# Patient Record
Sex: FEMALE | Race: WHITE | NOT HISPANIC OR LATINO | Employment: FULL TIME | URBAN - METROPOLITAN AREA
[De-identification: names, ages, dates, MRNs, and addresses within clinical notes are randomized per-mention and may not be internally consistent; named-entity substitution may affect disease eponyms.]

---

## 2024-01-12 ENCOUNTER — HOSPITAL ENCOUNTER (EMERGENCY)
Facility: HOSPITAL | Age: 44
Discharge: HOME | End: 2024-01-12
Attending: EMERGENCY MEDICINE
Payer: COMMERCIAL

## 2024-01-12 ENCOUNTER — APPOINTMENT (OUTPATIENT)
Dept: RADIOLOGY | Facility: HOSPITAL | Age: 44
End: 2024-01-12
Payer: COMMERCIAL

## 2024-01-12 ENCOUNTER — APPOINTMENT (OUTPATIENT)
Dept: CARDIOLOGY | Facility: HOSPITAL | Age: 44
End: 2024-01-12
Payer: COMMERCIAL

## 2024-01-12 VITALS
BODY MASS INDEX: 36.8 KG/M2 | TEMPERATURE: 97 F | HEIGHT: 62 IN | WEIGHT: 200 LBS | SYSTOLIC BLOOD PRESSURE: 140 MMHG | RESPIRATION RATE: 18 BRPM | HEART RATE: 58 BPM | OXYGEN SATURATION: 100 % | DIASTOLIC BLOOD PRESSURE: 72 MMHG

## 2024-01-12 DIAGNOSIS — R07.9 CHEST PAIN, UNSPECIFIED TYPE: Primary | ICD-10-CM

## 2024-01-12 DIAGNOSIS — R10.9 ABDOMINAL PAIN, UNSPECIFIED ABDOMINAL LOCATION: ICD-10-CM

## 2024-01-12 LAB
ALBUMIN SERPL BCP-MCNC: 4.8 G/DL (ref 3.4–5)
ALP SERPL-CCNC: 59 U/L (ref 33–110)
ALT SERPL W P-5'-P-CCNC: 14 U/L (ref 7–45)
ANION GAP SERPL CALC-SCNC: 13 MMOL/L (ref 10–20)
AST SERPL W P-5'-P-CCNC: 17 U/L (ref 9–39)
B-HCG SERPL-ACNC: <2 MIU/ML
BASOPHILS # BLD AUTO: 0.03 X10*3/UL (ref 0–0.1)
BASOPHILS NFR BLD AUTO: 0.6 %
BILIRUB SERPL-MCNC: 0.5 MG/DL (ref 0–1.2)
BUN SERPL-MCNC: 17 MG/DL (ref 6–23)
CALCIUM SERPL-MCNC: 9.5 MG/DL (ref 8.6–10.3)
CARDIAC TROPONIN I PNL SERPL HS: <3 NG/L (ref 0–13)
CHLORIDE SERPL-SCNC: 103 MMOL/L (ref 98–107)
CO2 SERPL-SCNC: 26 MMOL/L (ref 21–32)
CREAT SERPL-MCNC: 0.86 MG/DL (ref 0.5–1.05)
EGFRCR SERPLBLD CKD-EPI 2021: 86 ML/MIN/1.73M*2
EOSINOPHIL # BLD AUTO: 0.07 X10*3/UL (ref 0–0.7)
EOSINOPHIL NFR BLD AUTO: 1.3 %
ERYTHROCYTE [DISTWIDTH] IN BLOOD BY AUTOMATED COUNT: 12.6 % (ref 11.5–14.5)
FLUAV RNA RESP QL NAA+PROBE: NOT DETECTED
FLUBV RNA RESP QL NAA+PROBE: NOT DETECTED
GLUCOSE SERPL-MCNC: 86 MG/DL (ref 74–99)
HCT VFR BLD AUTO: 44.5 % (ref 36–46)
HGB BLD-MCNC: 14.5 G/DL (ref 12–16)
HOLD SPECIMEN: NORMAL
HOLD SPECIMEN: NORMAL
IMM GRANULOCYTES # BLD AUTO: 0.02 X10*3/UL (ref 0–0.7)
IMM GRANULOCYTES NFR BLD AUTO: 0.4 % (ref 0–0.9)
INR PPP: 1 (ref 0.9–1.1)
LIPASE SERPL-CCNC: 20 U/L (ref 9–82)
LYMPHOCYTES # BLD AUTO: 1.23 X10*3/UL (ref 1.2–4.8)
LYMPHOCYTES NFR BLD AUTO: 23.6 %
MAGNESIUM SERPL-MCNC: 2.18 MG/DL (ref 1.6–2.4)
MCH RBC QN AUTO: 28.8 PG (ref 26–34)
MCHC RBC AUTO-ENTMCNC: 32.6 G/DL (ref 32–36)
MCV RBC AUTO: 89 FL (ref 80–100)
MONOCYTES # BLD AUTO: 0.39 X10*3/UL (ref 0.1–1)
MONOCYTES NFR BLD AUTO: 7.5 %
NEUTROPHILS # BLD AUTO: 3.48 X10*3/UL (ref 1.2–7.7)
NEUTROPHILS NFR BLD AUTO: 66.6 %
NRBC BLD-RTO: 0 /100 WBCS (ref 0–0)
PLATELET # BLD AUTO: 321 X10*3/UL (ref 150–450)
POTASSIUM SERPL-SCNC: 4 MMOL/L (ref 3.5–5.3)
PROT SERPL-MCNC: 7.5 G/DL (ref 6.4–8.2)
PROTHROMBIN TIME: 10.9 SECONDS (ref 9.8–12.8)
RBC # BLD AUTO: 5.03 X10*6/UL (ref 4–5.2)
SARS-COV-2 RNA RESP QL NAA+PROBE: NOT DETECTED
SODIUM SERPL-SCNC: 138 MMOL/L (ref 136–145)
WBC # BLD AUTO: 5.2 X10*3/UL (ref 4.4–11.3)

## 2024-01-12 PROCEDURE — 85610 PROTHROMBIN TIME: CPT | Performed by: STUDENT IN AN ORGANIZED HEALTH CARE EDUCATION/TRAINING PROGRAM

## 2024-01-12 PROCEDURE — 2500000001 HC RX 250 WO HCPCS SELF ADMINISTERED DRUGS (ALT 637 FOR MEDICARE OP): Performed by: STUDENT IN AN ORGANIZED HEALTH CARE EDUCATION/TRAINING PROGRAM

## 2024-01-12 PROCEDURE — 84484 ASSAY OF TROPONIN QUANT: CPT | Performed by: STUDENT IN AN ORGANIZED HEALTH CARE EDUCATION/TRAINING PROGRAM

## 2024-01-12 PROCEDURE — 71046 X-RAY EXAM CHEST 2 VIEWS: CPT | Mod: COMPUTED RADIOGRAPHY X-RAY | Performed by: RADIOLOGY

## 2024-01-12 PROCEDURE — 93005 ELECTROCARDIOGRAM TRACING: CPT

## 2024-01-12 PROCEDURE — 83735 ASSAY OF MAGNESIUM: CPT | Performed by: STUDENT IN AN ORGANIZED HEALTH CARE EDUCATION/TRAINING PROGRAM

## 2024-01-12 PROCEDURE — 71275 CT ANGIOGRAPHY CHEST: CPT

## 2024-01-12 PROCEDURE — 36415 COLL VENOUS BLD VENIPUNCTURE: CPT | Performed by: STUDENT IN AN ORGANIZED HEALTH CARE EDUCATION/TRAINING PROGRAM

## 2024-01-12 PROCEDURE — 99285 EMERGENCY DEPT VISIT HI MDM: CPT | Performed by: EMERGENCY MEDICINE

## 2024-01-12 PROCEDURE — 84702 CHORIONIC GONADOTROPIN TEST: CPT | Performed by: STUDENT IN AN ORGANIZED HEALTH CARE EDUCATION/TRAINING PROGRAM

## 2024-01-12 PROCEDURE — 87636 SARSCOV2 & INF A&B AMP PRB: CPT | Performed by: STUDENT IN AN ORGANIZED HEALTH CARE EDUCATION/TRAINING PROGRAM

## 2024-01-12 PROCEDURE — 76705 ECHO EXAM OF ABDOMEN: CPT | Mod: FOREIGN READ | Performed by: RADIOLOGY

## 2024-01-12 PROCEDURE — 83690 ASSAY OF LIPASE: CPT | Performed by: STUDENT IN AN ORGANIZED HEALTH CARE EDUCATION/TRAINING PROGRAM

## 2024-01-12 PROCEDURE — 85025 COMPLETE CBC W/AUTO DIFF WBC: CPT | Performed by: STUDENT IN AN ORGANIZED HEALTH CARE EDUCATION/TRAINING PROGRAM

## 2024-01-12 PROCEDURE — 2550000001 HC RX 255 CONTRASTS: Performed by: EMERGENCY MEDICINE

## 2024-01-12 PROCEDURE — 2500000004 HC RX 250 GENERAL PHARMACY W/ HCPCS (ALT 636 FOR OP/ED): Performed by: STUDENT IN AN ORGANIZED HEALTH CARE EDUCATION/TRAINING PROGRAM

## 2024-01-12 PROCEDURE — 76705 ECHO EXAM OF ABDOMEN: CPT | Mod: FR

## 2024-01-12 PROCEDURE — 80053 COMPREHEN METABOLIC PANEL: CPT | Performed by: STUDENT IN AN ORGANIZED HEALTH CARE EDUCATION/TRAINING PROGRAM

## 2024-01-12 PROCEDURE — 71046 X-RAY EXAM CHEST 2 VIEWS: CPT | Mod: FY

## 2024-01-12 RX ORDER — LIDOCAINE HYDROCHLORIDE 20 MG/ML
3 SOLUTION OROPHARYNGEAL ONCE
Status: COMPLETED | OUTPATIENT
Start: 2024-01-12 | End: 2024-01-12

## 2024-01-12 RX ORDER — FAMOTIDINE 20 MG/1
20 TABLET, FILM COATED ORAL 2 TIMES DAILY
Qty: 30 TABLET | Refills: 0 | Status: SHIPPED | OUTPATIENT
Start: 2024-01-12 | End: 2024-01-27

## 2024-01-12 RX ORDER — LIDOCAINE HYDROCHLORIDE 20 MG/ML
3 SOLUTION OROPHARYNGEAL ONCE
Status: DISCONTINUED | OUTPATIENT
Start: 2024-01-12 | End: 2024-01-12

## 2024-01-12 RX ADMIN — LIDOCAINE HYDROCHLORIDE 3 ML: 20 SOLUTION ORAL; TOPICAL at 17:00

## 2024-01-12 RX ADMIN — SODIUM CHLORIDE 1000 ML: 9 INJECTION, SOLUTION INTRAVENOUS at 15:02

## 2024-01-12 RX ADMIN — IOHEXOL 90 ML: 350 INJECTION, SOLUTION INTRAVENOUS at 15:04

## 2024-01-12 ASSESSMENT — LIFESTYLE VARIABLES
EVER FELT BAD OR GUILTY ABOUT YOUR DRINKING: NO
HAVE YOU EVER FELT YOU SHOULD CUT DOWN ON YOUR DRINKING: NO
REASON UNABLE TO ASSESS: NO
EVER HAD A DRINK FIRST THING IN THE MORNING TO STEADY YOUR NERVES TO GET RID OF A HANGOVER: NO
HAVE PEOPLE ANNOYED YOU BY CRITICIZING YOUR DRINKING: NO

## 2024-01-12 ASSESSMENT — COLUMBIA-SUICIDE SEVERITY RATING SCALE - C-SSRS
2. HAVE YOU ACTUALLY HAD ANY THOUGHTS OF KILLING YOURSELF?: NO
1. IN THE PAST MONTH, HAVE YOU WISHED YOU WERE DEAD OR WISHED YOU COULD GO TO SLEEP AND NOT WAKE UP?: NO
6. HAVE YOU EVER DONE ANYTHING, STARTED TO DO ANYTHING, OR PREPARED TO DO ANYTHING TO END YOUR LIFE?: NO

## 2024-01-12 ASSESSMENT — PAIN DESCRIPTION - LOCATION: LOCATION: CHEST

## 2024-01-12 ASSESSMENT — PAIN DESCRIPTION - DESCRIPTORS: DESCRIPTORS: DISCOMFORT;PRESSURE

## 2024-01-12 ASSESSMENT — PAIN DESCRIPTION - ORIENTATION: ORIENTATION: MID

## 2024-01-12 ASSESSMENT — PAIN - FUNCTIONAL ASSESSMENT: PAIN_FUNCTIONAL_ASSESSMENT: 0-10

## 2024-01-12 ASSESSMENT — PAIN SCALES - GENERAL
PAINLEVEL_OUTOF10: 8
PAINLEVEL_OUTOF10: 5 - MODERATE PAIN

## 2024-01-12 ASSESSMENT — PAIN DESCRIPTION - PAIN TYPE: TYPE: CHRONIC PAIN

## 2024-01-12 NOTE — ED PROVIDER NOTES
HPI   Chief Complaint   Patient presents with    Chest Pain     Pain started at 10am, pain is in the center of the chest going into her back and then down the left side       This is a female patient, 43 years old, otherwise healthy presenting to the ED for chest pain, substernal, radiating to the back and into the left leg.  Started around 10 AM this morning, there is some decreased appetite there is associated with that, her last p.o. was 6 AM, when she had some breakfast sausage and eggs.  She denies any fever/chills, there is some increasing of the pain upon inspiration.    At the time of exam, she reports that the symptoms have improved on arrival to the ED.                              Vianey Coma Scale Score: 15                  Patient History   Past Medical History:   Diagnosis Date    Dry eye syndrome of unspecified lacrimal gland     Dry eye syndrome    Personal history of diseases of the skin and subcutaneous tissue     History of rosacea    Sjogren syndrome, unspecified (CMS/HCC)     Sjogrens syndrome     Past Surgical History:   Procedure Laterality Date    FOOT SURGERY  11/19/2012    Foot Surgery    MOUTH SURGERY  11/19/2012    Oral Surgery Tooth Extraction     No family history on file.  Social History     Tobacco Use    Smoking status: Not on file    Smokeless tobacco: Not on file   Substance Use Topics    Alcohol use: Not on file    Drug use: Not on file       Physical Exam   ED Triage Vitals [01/12/24 1300]   Temp Heart Rate Resp BP   36.1 °C (97 °F) 67 20 159/78      SpO2 Temp src Heart Rate Source Patient Position   100 % -- Monitor Sitting      BP Location FiO2 (%)     Right arm --       Physical Exam  Constitutional:       Appearance: Normal appearance.   HENT:      Head: Normocephalic and atraumatic.      Mouth/Throat:      Mouth: Mucous membranes are moist.   Eyes:      Extraocular Movements: Extraocular movements intact.   Cardiovascular:      Rate and Rhythm: Normal rate and regular  rhythm.      Heart sounds: Normal heart sounds. No murmur heard.  Pulmonary:      Effort: Pulmonary effort is normal. No respiratory distress.      Breath sounds: Normal breath sounds. No wheezing.   Abdominal:      General: There is no distension.      Palpations: Abdomen is soft.      Tenderness: There is abdominal tenderness. There is no guarding.      Comments: Epigastric and right upper quadrant area tenderness with some voluntary guarding, Pacheco's positive, no rigidity or rebound tenderness noted.   Musculoskeletal:      Right lower leg: No edema.      Left lower leg: No edema.   Skin:     General: Skin is warm and dry.   Neurological:      General: No focal deficit present.      Mental Status: She is alert and oriented to person, place, and time.   Psychiatric:         Mood and Affect: Mood normal.         Behavior: Behavior normal.         ED Course & MDM   Diagnoses as of 01/12/24 1733   Chest pain, unspecified type   Abdominal pain, unspecified abdominal location       Medical Decision Making  Chest attack called due to concern for dissection, will obtain basic laboratory workup to include cardiac enzymes.    My interpretation of the CT angiogram chest abdomen pelvis did not demonstrate evidence of dissection, no large aneurysm noted.  Laboratory studies were grossly unremarkable, CBC, CMP, troponin and INR within normal limits, lipase and magnesium within normal limits in the send swabs for COVID and flu negative.  In addition, the patient's right upper quadrant ultrasound did not demonstrate evidence of cholecystitis or cholelithiasis    On reassessment, patient is still comfortable appearing, she reports her pain is significantly improved since onset.  I will order for viscous lidocaine, she is requesting that she be discharged, she will be discharged home with outpatient PCP follow-up, prescription for Pepcid, strict return precautions for any new or worsening symptoms, she is agreeable to  plan    Discussed with the attending  Yrn Nails DO PGY-4  Emergency Medicine        Procedure  Procedures     Yrn Nails DO  Resident  01/12/24 1700       Yrn Nails DO  Resident  01/12/24 4936

## 2024-01-12 NOTE — Clinical Note
Turner Patel was seen and treated in our emergency department on 1/12/2024.  She may return to work on 01/15/2024.       If you have any questions or concerns, please don't hesitate to call.      Koffi Roman, DO

## 2024-02-12 LAB
ATRIAL RATE: 75 BPM
P AXIS: 41 DEGREES
P OFFSET: 194 MS
P ONSET: 148 MS
PR INTERVAL: 136 MS
Q ONSET: 216 MS
QRS COUNT: 12 BEATS
QRS DURATION: 94 MS
QT INTERVAL: 414 MS
QTC CALCULATION(BAZETT): 462 MS
QTC FREDERICIA: 445 MS
R AXIS: 44 DEGREES
T AXIS: 32 DEGREES
T OFFSET: 423 MS
VENTRICULAR RATE: 75 BPM

## 2024-06-05 ENCOUNTER — OFFICE VISIT (OUTPATIENT)
Dept: DERMATOLOGY | Facility: CLINIC | Age: 44
End: 2024-06-05
Payer: COMMERCIAL

## 2024-06-05 DIAGNOSIS — L23.89 OTHER ALLERGIC CONTACT DERMATITIS: ICD-10-CM

## 2024-06-05 DIAGNOSIS — D23.9 DERMATOFIBROMA: ICD-10-CM

## 2024-06-05 DIAGNOSIS — Z12.83 SKIN CANCER SCREENING: Primary | ICD-10-CM

## 2024-06-05 DIAGNOSIS — D22.9 NEVUS: ICD-10-CM

## 2024-06-05 PROCEDURE — 99203 OFFICE O/P NEW LOW 30 MIN: CPT | Performed by: NURSE PRACTITIONER

## 2024-06-05 RX ORDER — TRIAMCINOLONE ACETONIDE 1 MG/G
CREAM TOPICAL 2 TIMES DAILY PRN
Qty: 80 G | Refills: 1 | Status: SHIPPED | OUTPATIENT
Start: 2024-06-05

## 2024-06-05 NOTE — PROGRESS NOTES
Subjective     Turner Patel is a 43 y.o. female who presents for the following: Skin Check.     Review of Systems:  No other skin or systemic complaints other than what is documented elsewhere in the note.    The following portions of the chart were reviewed this encounter and updated as appropriate:          Skin Cancer History  No skin cancer on file.      Specialty Problems    None       Objective   Well appearing patient in no apparent distress; mood and affect are within normal limits.    A focused skin examination was performed. All findings within normal limits unless otherwise noted below.    Assessment/Plan   1. Skin cancer screening    The patient presented for a routine skin examination today. There are no specific concerns regarding skin health and no new or changing moles, lesions, or rashes.     Assessment: Based on the comprehensive skin examination, there were no concerning or abnormal findings. The patient's skin appeared to be in good health, without any notable dermatologic conditions or lesions.    Plan: Given the absence of any significant skin findings, no specific interventions or treatments are warranted at this time. The patient was educated on the importance of regular skin self-examinations and advised to promptly report any changes or concerns. Routine follow-up for a skin examination was recommended.    -These lesions have benign, reassuring patterns on dermoscopy.  -There were no concerning features found on exam today.  -Recommend continued self-observation, and to contact the office if any changes in nevi are  noticed.    Discussed/information given on safe sun practices and use of sunscreen, sun protective clothing or sun avoidance. Recommend to use OTC medication of sunscreen SPF 30 or higher on a daily basis prior to sun exposure to reduce the risk of skin cancer.    Contact Office if: Any lesions change in size, shape or color; itch, bum or bleed.         2. Nevus  Multiple  benign appearing flesh colored to pigmented macules and papules     Plan: Counseling.  I counseled the patient regarding the following:  Instructions: Monthly self-skin checks to monitor for any changes in moles are recommended. Expectations: Benign Nevi are pigmented nests of cells within the skin.No treatment is necessary. Contact Office if: Any moles change in size, shape or color; itch, bum or bleed.    3. Dermatofibroma  Right Thigh - Anterior  Firm pink-brown papule that dimples with lateral pressure.    4. Other allergic contact dermatitis  Left Lower Leg - Anterior    triamcinolone (Kenalog) 0.1 % cream - Left Lower Leg - Anterior  Apply topically 2 times a day as needed for rash.

## 2024-06-05 NOTE — PROGRESS NOTES
Subjective     Turner Patel is a 43 y.o. female who presents for the following: Skin Check.   New patient in today for full body skin exam, patient states this is her first ever exam.     Review of Systems:  No other skin or systemic complaints other than what is documented elsewhere in the note.    The following portions of the chart were reviewed this encounter and updated as appropriate:       Skin Cancer History  No skin cancer on file.    Specialty Problems    None    Past Medical History:  Turner Patel  has a past medical history of Dry eye syndrome of unspecified lacrimal gland, Personal history of diseases of the skin and subcutaneous tissue, and Sjogren syndrome, unspecified (Multi).    Past Surgical History:  Turner Patel  has a past surgical history that includes Foot surgery (11/19/2012) and Mouth surgery (11/19/2012).    Family History:  Patient family history is not on file.    Social History:  Turner Patel  has no history on file for tobacco use, alcohol use, and drug use.    Allergies:  Penicillin    Current Medications / CAM's:    Current Outpatient Medications:     famotidine (Pepcid) 20 mg tablet, Take 1 tablet (20 mg) by mouth 2 times a day for 15 days., Disp: 30 tablet, Rfl: 0     Objective   Well appearing patient in no apparent distress; mood and affect are within normal limits.    A full examination was performed including scalp, head, eyes, ears, nose, lips, neck, chest, axillae, abdomen, back, buttocks, bilateral upper extremities, bilateral lower extremities, hands, feet, fingers, toes, fingernails, and toenails. All findings within normal limits unless otherwise noted below.    Assessment/Plan   1. Skin cancer screening    The patient presented for a routine skin examination today. There are no specific concerns regarding skin health and no new or changing moles, lesions, or rashes.     Assessment: Based on the comprehensive skin examination, there were no concerning or  abnormal findings. The patient's skin appeared to be in good health, without any notable dermatologic conditions or lesions.    Plan: Given the absence of any significant skin findings, no specific interventions or treatments are warranted at this time. The patient was educated on the importance of regular skin self-examinations and advised to promptly report any changes or concerns. Routine follow-up for a skin examination was recommended.    -These lesions have benign, reassuring patterns on dermoscopy.  -There were no concerning features found on exam today.  -Recommend continued self-observation, and to contact the office if any changes in nevi are  noticed.    Discussed/information given on safe sun practices and use of sunscreen, sun protective clothing or sun avoidance. Recommend to use OTC medication of sunscreen SPF 30 or higher on a daily basis prior to sun exposure to reduce the risk of skin cancer.    Contact Office if: Any lesions change in size, shape or color; itch, bum or bleed.         2. Nevus  Multiple benign appearing flesh colored to pigmented macules and papules     Plan: Counseling.  I counseled the patient regarding the following:  Instructions: Monthly self-skin checks to monitor for any changes in moles are recommended. Expectations: Benign Nevi are pigmented nests of cells within the skin.No treatment is necessary. Contact Office if: Any moles change in size, shape or color; itch, bum or bleed.

## 2024-06-14 ENCOUNTER — APPOINTMENT (OUTPATIENT)
Dept: RADIOLOGY | Facility: HOSPITAL | Age: 44
End: 2024-06-14
Payer: COMMERCIAL

## 2024-06-14 ENCOUNTER — HOSPITAL ENCOUNTER (OUTPATIENT)
Dept: RADIOLOGY | Facility: HOSPITAL | Age: 44
Discharge: HOME | End: 2024-06-14
Payer: COMMERCIAL

## 2024-06-14 VITALS — WEIGHT: 195 LBS | BODY MASS INDEX: 35.88 KG/M2 | HEIGHT: 62 IN

## 2024-06-14 DIAGNOSIS — Z12.31 ENCOUNTER FOR SCREENING MAMMOGRAM FOR MALIGNANT NEOPLASM OF BREAST: ICD-10-CM

## 2024-06-14 PROCEDURE — 77067 SCR MAMMO BI INCL CAD: CPT

## 2024-06-21 ENCOUNTER — HOSPITAL ENCOUNTER (OUTPATIENT)
Dept: RADIOLOGY | Facility: HOSPITAL | Age: 44
Discharge: HOME | End: 2024-06-21
Payer: COMMERCIAL

## 2024-06-21 DIAGNOSIS — M25.551 PAIN IN RIGHT HIP: ICD-10-CM

## 2024-06-21 PROCEDURE — 73502 X-RAY EXAM HIP UNI 2-3 VIEWS: CPT | Mod: RT

## 2025-02-11 ENCOUNTER — HOSPITAL ENCOUNTER (OUTPATIENT)
Dept: CARDIOLOGY | Facility: HOSPITAL | Age: 45
Discharge: HOME | End: 2025-02-11
Payer: COMMERCIAL

## 2025-02-11 DIAGNOSIS — I73.9 PERIPHERAL VASCULAR DISEASE, UNSPECIFIED (CMS-HCC): ICD-10-CM

## 2025-02-11 PROCEDURE — 93922 UPR/L XTREMITY ART 2 LEVELS: CPT

## 2025-04-15 ENCOUNTER — APPOINTMENT (OUTPATIENT)
Dept: CARDIOLOGY | Facility: CLINIC | Age: 45
End: 2025-04-15
Payer: COMMERCIAL

## 2025-04-16 ENCOUNTER — OFFICE VISIT (OUTPATIENT)
Dept: VASCULAR SURGERY | Facility: CLINIC | Age: 45
End: 2025-04-16
Payer: COMMERCIAL

## 2025-04-16 VITALS
OXYGEN SATURATION: 99 % | BODY MASS INDEX: 30 KG/M2 | HEART RATE: 81 BPM | DIASTOLIC BLOOD PRESSURE: 52 MMHG | SYSTOLIC BLOOD PRESSURE: 120 MMHG | HEIGHT: 62 IN | WEIGHT: 163 LBS

## 2025-04-16 DIAGNOSIS — M35.00 SJOGREN'S SYNDROME, WITH UNSPECIFIED ORGAN INVOLVEMENT (MULTI): ICD-10-CM

## 2025-04-16 DIAGNOSIS — I73.00 RAYNAUD'S PHENOMENON WITHOUT GANGRENE: Primary | ICD-10-CM

## 2025-04-16 PROCEDURE — 99214 OFFICE O/P EST MOD 30 MIN: CPT | Performed by: NURSE PRACTITIONER

## 2025-04-16 PROCEDURE — 3008F BODY MASS INDEX DOCD: CPT | Performed by: NURSE PRACTITIONER

## 2025-04-16 PROCEDURE — 99204 OFFICE O/P NEW MOD 45 MIN: CPT | Performed by: NURSE PRACTITIONER

## 2025-04-16 RX ORDER — MELOXICAM 15 MG/1
1 TABLET ORAL
COMMUNITY
Start: 2025-03-22

## 2025-04-16 RX ORDER — AMLODIPINE BESYLATE 5 MG/1
5 TABLET ORAL DAILY
COMMUNITY
End: 2025-04-16 | Stop reason: SDUPTHER

## 2025-04-16 RX ORDER — AMLODIPINE BESYLATE 10 MG/1
10 TABLET ORAL DAILY
Qty: 90 TABLET | Refills: 3 | Status: SHIPPED | OUTPATIENT
Start: 2025-04-16 | End: 2026-04-16

## 2025-04-16 RX ORDER — LEVONORGESTREL 52 MG/1
1 INTRAUTERINE DEVICE INTRAUTERINE ONCE
COMMUNITY

## 2025-04-16 NOTE — PROGRESS NOTES
"Subjective   Turner PARK is a 44 y.o. female.    Chief Complaint:  45yo patient referred by primary care for Raynaud's.     HPI  Diagnosed in 2010 by Dr. Hernandez (Rheumatology). Presented with dry eyes for evaluation. Diagnosed with Sjogren's. 2013 had blistering on toes while shoveling snow. Started efforts to minimize cold exposure, fairly well controlled. MANFRED positive 2024. Had 40lb intentional weight loss. Endorses constant bilateral finger and toe coldness. Fingers now turn pale with blisters. Medication adherent- started on Norvasc 5mg x 1 month w/o significant improvement. Evaluated by Dr. Maria (Vascular Surgeon at [facility]). Referred for PVR, EKG, and CT chest/abd/pelvis.     PMHx: Raynaud's, Sjogren's  PSHx: Left foot surgery  Social Hx: Never smoker. Denies EtoH/illicit drug use.   Family Hx: Dad- Afib, Mother- Fibromyalgia. Daughter- Raynaud's. Son- Raynaud's.     ROS    Objective   Visit Vitals  /52 (BP Location: Left arm, Patient Position: Sitting)   Pulse 81   Ht 1.575 m (5' 2\")   Wt 73.9 kg (163 lb)   SpO2 99%   BMI 29.81 kg/m²   OB Status Having periods   Smoking Status Never   BSA 1.8 m²     Physical Exam    Lab Review:   Lab Results   Component Value Date     01/12/2024    K 4.0 01/12/2024     01/12/2024    CO2 26 01/12/2024    BUN 17 01/12/2024    CREATININE 0.86 01/12/2024    GLUCOSE 86 01/12/2024    CALCIUM 9.5 01/12/2024     Lab Results   Component Value Date    WBC 5.2 01/12/2024    HGB 14.5 01/12/2024    HCT 44.5 01/12/2024    MCV 89 01/12/2024     01/12/2024     Lab Results   Component Value Date    CHOL 127 09/12/2022    TRIG 82 09/12/2022    HDL 54.0 09/12/2022     Current Medications[1]    Assessment/Plan   Raynaud's  Bilateral finger and to discoloration. Cold intolerance.   Referral to Dr. Sasha Ramos.          [1]    Current Outpatient Medications:   •  famotidine (Pepcid) 20 mg tablet, Take 1 tablet (20 mg) by mouth 2 times a day for 15 days., " Disp: 30 tablet, Rfl: 0  •  triamcinolone (Kenalog) 0.1 % cream, Apply topically 2 times a day as needed for rash., Disp: 80 g, Rfl: 1   diastolic filling. LVEF 55-60%.   LV with normal diastolic function.   Left Atrium: The left atrium is normal in size.   Right Ventricle: The right ventricle is normal in size. There is normal right ventricular global systolic function.   Right Atrium: The right atrium is normal in size.   Aortic Valve: The aortic valve was not well visualized. There is no evidence of aortic valve regurgitation. The peak instantaneous gradient of the aortic valve is 16.5 mmHg. The mean gradient of the aortic valve is 10.0 mmHg. AV with no stenosis or regurgitation.   Mitral Valve: The mitral valve is normal in structure. There is no evidence of mitral valve stenosis. There is no evidence of mitral valve regurgitation.   Tricuspid Valve: The tricuspid valve is structurally normal. There is mild to moderate tricuspid regurgitation. The Doppler estimated RVSP is within normal limits at 28.4 mmHg. TR is eccentric with jet directed toward interatrial septum.   Pulmonic Valve: The pulmonic valve is not well visualized. The pulmonic valve regurgitation was not well visualized.   Pericardium: There is no pericardial effusion noted.   Aorta: The aortic root is normal.       CONCLUSIONS:   1. The left ventricular systolic function is normal.   2. LVEF 55-60%.   LV with normal diastolic function.   3. RVSP within normal limits.   4. There is mild to moderate tricuspid regurgitation.   5. TR is eccentric with jet directed toward interatrial septum.   6. AV with no stenosis or regurgitation.     QUANTITATIVE DATA SUMMARY:   2D MEASUREMENTS:   Normal Ranges:   Ao Root d:     3.20 cm   (2.0-3.7cm)   LAs:           3.30 cm   (2.7-4.0cm)   RVIDd:         2.21 cm   (0.9-3.6cm)   IVSd:          1.08 cm   (0.6-1.1cm)   LVPWd:         0.85 cm   (0.6-1.1cm)   LVIDd:         4.82 cm   (3.9-5.9cm)   LVIDs:         3.38 cm   LV Mass Index: 87.1 g/m2   LV % FS        29.9 %     AORTA MEASUREMENTS:   Normal Ranges:   Asc Ao, d: 2.50 cm (2.1-3.4cm)     LV  SYSTOLIC FUNCTION BY 2D PLANIMETRY (MOD):   Normal Ranges:   EF-A4C View: 57.8 % (>55%)     LV DIASTOLIC FUNCTION:   Normal Ranges:   MV Peak E:    0.93 m/s (0.7-1.2 m/s)   MV Peak A:    0.63 m/s (0.42-0.7 m/s)   E/A Ratio:    1.49     (1.0-2.2)   MV lateral e' 0.16 m/s   MV medial e'  0.11 m/s   E/e' Ratio:   5.90     (<8.0)     AORTIC VALVE:   Normal Ranges:   AoV Vmax:                2.03 m/s  (<1.7m/s)   AoV Peak P.5 mmHg (<20mmHg)   AoV Mean PG:             10.0 mmHg (1.7-11.5mmHg)   LVOT Max Toni:            1.22 m/s  (<1.1m/s)   AoV VTI:                 45.60 cm  (18-25cm)   LVOT VTI:                28.40 cm   LVOT Diameter:           1.70 cm   (1.8-2.4cm)   AoV Area, VTI:           1.41 cm2  (2.5-5.5cm2)   AoV Area,Vmax:           1.36 cm2  (2.5-4.5cm2)   AoV Dimensionless Index: 0.62     TRICUSPID VALVE/RVSP:   Normal Ranges:   Peak TR Velocity: 2.52 m/s   RV Syst Pressure: 28.4 mmHg (< 30mmHg)     PULMONIC VALVE:   Normal Ranges:   PV Max Toni: 0.7 m/s  (0.6-0.9m/s)   PV Max P.0 mmHg       [id number] Drew Richter MD   Electronically signed on 10/14/2020 at 1:04:45 PM     [facility] US ANKLE BRACHIAL INDEX (JENIFFER) WITHOUT EXERCISE     Patient Name:      ISABELLA Gordillo Physician:  [id number] JAY Olivas MD  Study Date:        2025             Ordering Provider:  [id number] BROOK PADILLA  MRN/PID:           [id number]              Fellow:  Accession#:        [id number]          Technologist:       Sho Pacheco RVT, [facility]  Date of Birth/Age: 1980 / 44 years Technologist 2:  Gender:            F                     Encounter#:         [id number]  Admission Status:  Outpatient            Location Performed: [facility]         Diagnosis/ICD: Peripheral vascular disease, unspecified-I73.9  Indication:    Discolored digits  CPT Codes:     96794 Peripheral artery JENIFFER Only        Pertinent History: Digits are discolored w/ blood blister appearance.        CONCLUSIONS:  Right Lower PVR: No evidence of arterial occlusive disease in the right lower extremity at rest. Decreased digital perfusion noted. Multiphasic flow is noted in the right common femoral artery, right posterior tibial artery and right dorsalis pedis artery. The digit tracings at rest are flatlined.  Left Lower PVR: No evidence of arterial occlusive disease in the left lower extremity at rest. Decreased digital perfusion noted. Multiphasic flow is noted in the left common femoral artery, left posterior tibial artery and left dorsalis pedis artery. The digit tracings at rest are flatlined. Able to obtain a .30 TBI @ the start of test.     Imaging & Doppler Findings:     RIGHT Lower PVR                Pressures Ratios  Right Posterior Tibial (Ankle) 141 mmHg  1.24  Right Dorsalis Pedis (Ankle)   130 mmHg  1.14  Right Digit (Great Toe)        0 mmHg    0.00           LEFT Lower PVR                Pressures Ratios  Left Posterior Tibial (Ankle) 146 mmHg  1.28  Left Dorsalis Pedis (Ankle)   144 mmHg  1.26  Left Digit (Great Toe)        34 mmHg   0.30                              Right     Left  Brachial Pressure 114 mmHg 113 mmHg           [id number] JAY Olivas MD  Electronically signed by [id number] Olivas MD on 2/12/2025 at 1:58:23 PM     Current Medications[1]    Assessment/Plan   Secondary Raynaud's Phenomenon in setting of Sjogren's Syndrome  Endorses constant bilateral finger and toe coldness. Fingers now turn pale with blisters. Medication adherent- started on Norvasc 5mg x 1 month w/o significant improvement.   Bilateral toes are cyanotic and cool to touch on exam. See media images. Palpable bilateral DP/PT pulses.   Reviewed outside records MANFRED  negative (2022).  Rheumatoid Factor 19 (2022). ESR WNL (2022). CRP WNL (2022). TSH/CBC?CMP 1/2025 WNL.   Not a smoker. Counseled to continue to avoid precipitating exposures. No relief with Amlodipine 5mg every day. Increase Amlodipine 10mg every day.   Referral to Dr. Sasha Ramos for Vascular Medicine. Discussed with Dr. Sasha Ramos. Advised patient to schedule follow up with Rheumatology.        [1]   Current Outpatient Medications:     famotidine (Pepcid) 20 mg tablet, Take 1 tablet (20 mg) by mouth 2 times a day for 15 days., Disp: 30 tablet, Rfl: 0    triamcinolone (Kenalog) 0.1 % cream, Apply topically 2 times a day as needed for rash., Disp: 80 g, Rfl: 1

## 2025-04-28 ASSESSMENT — ENCOUNTER SYMPTOMS
PARESTHESIAS: 0
NUMBNESS: 0
CLAUDICATION: 0
POOR WOUND HEALING: 1
COLOR CHANGE: 1
WEIGHT LOSS: 1

## 2025-05-28 ENCOUNTER — APPOINTMENT (OUTPATIENT)
Dept: LAB | Facility: HOSPITAL | Age: 45
End: 2025-05-28
Payer: COMMERCIAL

## 2025-05-28 ENCOUNTER — APPOINTMENT (OUTPATIENT)
Facility: CLINIC | Age: 45
End: 2025-05-28
Payer: COMMERCIAL

## 2025-05-28 VITALS
TEMPERATURE: 98.4 F | DIASTOLIC BLOOD PRESSURE: 75 MMHG | WEIGHT: 159 LBS | HEIGHT: 63 IN | BODY MASS INDEX: 28.17 KG/M2 | HEART RATE: 67 BPM | SYSTOLIC BLOOD PRESSURE: 124 MMHG

## 2025-05-28 DIAGNOSIS — M35.00 SJOGREN'S SYNDROME, WITH UNSPECIFIED ORGAN INVOLVEMENT (MULTI): Primary | ICD-10-CM

## 2025-05-28 DIAGNOSIS — I73.00 RAYNAUD'S PHENOMENON WITHOUT GANGRENE: ICD-10-CM

## 2025-05-28 PROCEDURE — 84155 ASSAY OF PROTEIN SERUM: CPT

## 2025-05-28 PROCEDURE — 3008F BODY MASS INDEX DOCD: CPT | Performed by: INTERNAL MEDICINE

## 2025-05-28 PROCEDURE — 84165 PROTEIN E-PHORESIS SERUM: CPT

## 2025-05-28 PROCEDURE — 1036F TOBACCO NON-USER: CPT | Performed by: INTERNAL MEDICINE

## 2025-05-28 PROCEDURE — 99205 OFFICE O/P NEW HI 60 MIN: CPT | Performed by: INTERNAL MEDICINE

## 2025-05-28 RX ORDER — FLUOXETINE 20 MG/1
20 CAPSULE ORAL DAILY
Qty: 60 CAPSULE | Refills: 0 | Status: SHIPPED | OUTPATIENT
Start: 2025-05-28 | End: 2025-07-27

## 2025-05-28 RX ORDER — NITROGLYCERIN 20 MG/G
0.3 OINTMENT TOPICAL
Qty: 6 G | Refills: 0 | Status: SHIPPED | OUTPATIENT
Start: 2025-05-28 | End: 2025-07-27

## 2025-05-28 RX ORDER — SERTRALINE HYDROCHLORIDE 25 MG/1
25 TABLET, FILM COATED ORAL DAILY
Qty: 30 TABLET | Refills: 1 | Status: SHIPPED | OUTPATIENT
Start: 2025-05-28 | End: 2025-05-28 | Stop reason: ALTCHOICE

## 2025-05-28 NOTE — PATIENT INSTRUCTIONS
I am ordering some blood tests    We can increase the dose of amlodipine to 15 mg daily    Take tylenol extra 1000 mg upto three times a day for joint pains and take Meloxicam as needed not daily.    And I am adding a medicine called Prozac to help with the Raynauds episodes.

## 2025-05-28 NOTE — PROGRESS NOTES
Subjective   Patient ID: [id number]   Turner PARK is a 44 y.o. female who presents for raynauds (Hands are cold and toe issue).  HPI    New referral for Raynauds -   Diagnosed with secondary Raynauds about a decade ago with seronegative SjD  Initially was on Nifedipine which helped then she was doing better and stopped CCB and continued with conservative measures    Recently , she has been having recurrent RP episodes  Last winter was the worst  With now permanent cyanosis of the right few toes, leading upto blister formation  She has been seen by vascular medicine and surgery  Had PVR and duplex done showing no flow in the distral digital arteries  Resumed back on Amlodipine 5 mg by PCP prior to referral  And increased to 10 mg recently in April  Symptoms are still persistent in the spring, gets daily episodes with exposure to cold environment  The toe discoloration is somewhat better but is now persistent  No further blister formation, no black discoloration  Also c/o dryness in the mouth, not bothersome, no dental erosions  Minimal xeropthalmia, does not use eye drops but not wearing contact lenses anymore  Has rosacea over the face, but no malar rash per say, alopecia, hand stiffness or swelling  Occasionally gets pain in the ankles, feet, wrists by the end of the day and takes meloxicam consistently for relief  She works as a   No muscle weakness, physically very active, and has successfully lost weight  No issues with overhead movements, getting from a sitting position  No skin tightness, telengiectasias, GERD or Dysphagia  Denies shortness of breath, no prior ILD history  No SIBO or long standing diarrhea like symptoms  Non smoker    4 children, no pregnancy complications   No hx of spontaneous miscarriages  No VTE history      Uses biotene spray for oral dryness that helps. Never been on sialogogues  Was previously tested for AI - MANFRED + EMILY negative and RF low titre.  For sjogrens does not recall  any ocular staining, schirmer tests , SGUS or SGBx    ROS      Problem List[1]     Medical History[2]     Surgical History[3]     Social History     Socioeconomic History    Marital status:      Spouse name: Not on file    Number of children: Not on file    Years of education: Not on file    Highest education level: Not on file   Occupational History    Not on file   Tobacco Use    Smoking status: Never    Smokeless tobacco: Never   Substance and Sexual Activity    Alcohol use: Never    Drug use: Never    Sexual activity: Not on file   Other Topics Concern    Not on file   Social History Narrative    Not on file     Social Drivers of Health     Financial Resource Strain: Low Risk  (8/8/2024)    Received from [facility]    Overall Financial Resource Strain (CARDIA)     Difficulty of Paying Living Expenses: Not hard at all   Food Insecurity: No Food Insecurity (8/8/2024)    Received from [facility]    Hunger Vital Sign     Worried About Running Out of Food in the Last Year: Never true     Ran Out of Food in the Last Year: Never true   Transportation Needs: No Transportation Needs (8/8/2024)    Received from [facility]    PRAPARE - Transportation     Lack of Transportation (Medical): No     Lack of Transportation (Non-Medical): No   Physical Activity: Insufficiently Active (8/8/2024)    Received from [facility]    Exercise Vital Sign     Days of Exercise per Week: 6 days     Minutes of Exercise per Session: 20 min   Stress: No Stress Concern Present (8/8/2024)    Received from [facility]    [employer] - Occupational Stress Questionnaire     Feeling of Stress : Only a little   Social Connections: Socially Integrated (8/8/2024)    Received from [facility]    Social Connection and Isolation Panel [NHANES]     Frequency of Communication with Friends and Family: More than three times a week     Frequency of Social Gatherings with Friends and Family: Once a  week     Attends Tenriism Services: More than 4 times per year     Active Member of Clubs or Organizations: Yes     Attends Club or Organization Meetings: More than 4 times per year     Marital Status:    Intimate Partner Violence: Not on file   Housing Stability: Low Risk  (8/8/2024)    Received from [facility]    Housing Stability Vital Sign     Unable to Pay for Housing in the Last Year: No     Number of Times Moved in the Last Year: 0     Homeless in the Last Year: No        RX Allergies[4]     Current Medications[5]       Objective     Visit Vitals  /75   Pulse 67   Temp 36.9 °C (98.4 °F)        Physical Exam    Active RP of 2nd, 3rd left hand  Cyanotic 2nd and 3rd toe  No ischemic changes  NFC images as below showing few enlarged capillaries; elongated capillaries, and drop out avascular areas  No microhemorrhages ;distorted caps or ramified caps                Satisfactory salivary pooling  No parotid enlargement  No synovitis  No sclerodactyly  Rosacea over the face  Normal oral aperture    Lungs without crackles  CVS no signs of pulmonary hypertension    General: AAOx3, Cooperative      Hand/Fingers: No erythema, swelling, tenderness or warmth at DIP, PIP, or MCP joints, FROM grossly. Good hand . No nodules. No deformities   Wrists: No erythema, swelling, warmth or tenderness at wrist, FROM grossly  Elbows: No tenderness, swelling, erythema or warmth at elbows, FROM grossly. No nodules   Shoulders: No swelling, erythema, tenderness or warmth at shoulders. FROM  Lower Extremities:   Hips: No obvious deformities. No joint tenderness, normal ROM grossly. Log roll test negative bilaterally. Myriam test is negative bilaterally. No trochanteric bursae TTP  Knees: No tenderness, deformities, swelling, rashes, or warmth, normal ROM grossly. No crepitus, no pes anserine bursa TTP   Ankles: No deformities, tenderness, edema, erythema, ulceration, or warmth at the ankle  Feet: Negative MTP  "squeeze. Normal ROM grossly.   Spine: No spinal tenderness to palpation. No SI joint tenderness. Gaenslen test negative       [unfilled]      Lab Results   Component Value Date    WBC 5.2 01/12/2024    HGB 14.5 01/12/2024    HCT 44.5 01/12/2024    MCV 89 01/12/2024     01/12/2024        Chemistry    Lab Results   Component Value Date/Time     01/12/2024 1417    K 4.0 01/12/2024 1417     01/12/2024 1417    CO2 26 01/12/2024 1417    BUN 17 01/12/2024 1417    CREATININE 0.86 01/12/2024 1417    Lab Results   Component Value Date/Time    CALCIUM 9.5 01/12/2024 1417    ALKPHOS 59 01/12/2024 1417    AST 17 01/12/2024 1417    ALT 14 01/12/2024 1417    BILITOT 0.5 01/12/2024 1417           No results found for: \"CRP\"   Lab Results   Component Value Date    SEDRATE 2 10/13/2020      No results found for: \"CKTOTAL\"  Lab Results   Component Value Date    NEUTROABS 3.48 01/12/2024      No results found for: \"FERRITIN\"   No results found for: \"HEPATOT\", \"HEPAIGM\", \"HEPBCIGM\", \"HEPBCAB\", \"HBEAG\", \"HEPCAB\"   Lab Results   Component Value Date    ALT 14 01/12/2024    AST 17 01/12/2024    ALKPHOS 59 01/12/2024    BILITOT 0.5 01/12/2024      No results found for: \"PPD\"   No results found for: \"URICACID\"   Lab Results   Component Value Date    CALCIUM 9.5 01/12/2024      No results found for: \"SPEP\", \"UPEP\"   No results found for: \"ALBUR\", \"IZG72VBA\"   .last          Vascular US ankle brachial index (JENIFFER) without exercise              [facility]  [address] [place] [address]      Tel [phone] Fax [phone]       Vascular Lab Report     VASC US ANKLE BRACHIAL INDEX (JENIFFER) WITHOUT EXERCISE    Patient Name:      ISABELLA Gordillo Physician:  [id number] JAY Olivas MD  Study Date:        2/11/2025             Ordering Provider:  [id number] GRANDA" VALERIE  MRN/PID:           [id number]              Fellow:  Accession#:        [id number]          Technologist:       Sho Pacheco                                                               RVT, RDMS  Date of Birth/Age: 1980 / 44 years Technologist 2:  Gender:            F                     Encounter#:         [id number]  Admission Status:  Outpatient            Location Performed: [facility]       Diagnosis/ICD: Peripheral vascular disease, unspecified-I73.9  Indication:    Discolored digits  CPT Codes:     86299 Peripheral artery JENIFFER Only       Pertinent History: Digits are discolored w/ blood blister appearance.       CONCLUSIONS:  Right Lower PVR: No evidence of arterial occlusive disease in the right lower extremity at rest. Decreased digital perfusion noted. Multiphasic flow is noted in the right common femoral artery, right posterior tibial artery and right dorsalis pedis artery. The digit tracings at rest are flatlined.  Left Lower PVR: No evidence of arterial occlusive disease in the left lower extremity at rest. Decreased digital perfusion noted. Multiphasic flow is noted in the left common femoral artery, left posterior tibial artery and left dorsalis pedis artery. The digit tracings at rest are flatlined. Able to obtain a .30 TBI @ the start of test.     Imaging & Doppler Findings:     RIGHT Lower PVR                Pressures Ratios  Right Posterior Tibial (Ankle) 141 mmHg  1.24  Right Dorsalis Pedis (Ankle)   130 mmHg  1.14  Right Digit (Great Toe)        0 mmHg    0.00          LEFT Lower PVR                Pressures Ratios  Left Posterior Tibial (Ankle) 146 mmHg  1.28  Left Dorsalis Pedis (Ankle)   144 mmHg  1.26  Left Digit (Great Toe)        34 mmHg   0.30                             Right     Left  Brachial Pressure 114 mmHg 113 mmHg          Morena Olivas MD  Electronically signed by Morena Alan  Deisy LOPEZ on 2/12/2025 at 1:58:23 PM       ** Final **     === 06/21/24 ===    XR HIP RIGHT WITH PELVIS WHEN PERFORMED 2 OR 3 VIEWS    - Impression -  No abnormalities seen in the right hip      MACRO:  None    Signed by: Kirill Toribio 6/22/2024 8:56 AM  Dictation workstation:   [id number]   === 06/04/21 ===    - Impression -  1. Moderate tibialis posterior tendinosis with mild tenosynovitis. In  addition, there is mild thickening of the spring ligament which may  reflect low-grade chronic sprain.  2. Nonspecific mild marrow edema involving the navicular bone at its  lateral aspect. This may reflect low-grade stress injury.          Assessment/Plan   Diagnoses and all orders for this visit:  Sjogren's syndrome, with unspecified organ involvement (Multi)  Previously labelled seronegative SjD   RF low titre  Will evaluate further with C3,C4, SGUS and SPEP  APLS serology  Conservative measures for sicca symptoms for now, not troublesome for her  If C4 is low, will check for cryoglobulinemia as well  Otherwise has no features of purpura, renal involvement    Raynaud's phenomenon without gangrene  Has rather had refractory RP of toes in the last few months   NFC changes - drop out, elongated cap loops - no late features of Ssc  Otherwiuse no Ssc features also no ramified caps seen   There is no IIM signs or symptoms as well  CT angio by vascular did not show any thrombi   With evidence of very low flow in the digital vessels in the PVR  No gangrene    She is worried about pedal edema from the Amlodipine   It is worse toward the end of the day  She cannot wear compression stockings because of RP in the feet    Adding Fluoxetine to help with RP as an adjunct to CCB  S/e profile explained  And will use nitroglycerine ointment for the toes    -     US neck parotid; Future  -     Serum Protein Electrophoresis; Future  -     C3 Complement; Future  -     C4 Complement; Future  -     Protein, Urine Random; Future  -      Sedimentation Rate; Future  -     Follow Up In Rheumatology; Future  -     nitroglycerin (Bruno-Bid) 2 % ointment; Place 0.3 inches on the skin every 6 hours during the day.  -     Anti-Cardiolipin Antibody (IgA, IgG, and IgM); Future  -     Lupus Anticoag. With Interpretation[King]; Future  -     Beta-2 Glycoprotein Antibodies; Future  -     Follow Up In Rheumatology; Future  -     FLUoxetine (PROzac) 20 mg capsule; Take 1 capsule (20 mg) by mouth once daily.    She is aware of the conservative measures of RP      Follow up x 10-12 weeks     Anjel Sanchez MD FACR   of Medicine  [facility] - Department of Rheumatology  [facility]   Plan, including risks and benefits, was discussed with the patient, informed on how to reach us.     To schedule an appointment, call  [phone] [place] or call [phone] for [facility]        [1] There is no problem list on file for this patient.  [2]   Past Medical History:  Diagnosis Date    Dry eye syndrome of unspecified lacrimal gland     Dry eye syndrome    Personal history of diseases of the skin and subcutaneous tissue     History of rosacea    Sjogren syndrome, unspecified (Multi)     Sjogrens syndrome   [3]   Past Surgical History:  Procedure Laterality Date    FOOT SURGERY  11/19/2012    Foot Surgery    MOUTH SURGERY  11/19/2012    Oral Surgery Tooth Extraction   [4]   Allergies  Allergen Reactions    Penicillin Rash   [5]   Current Outpatient Medications:     amLODIPine (Norvasc) 10 mg tablet, Take 1 tablet (10 mg) by mouth once daily., Disp: 90 tablet, Rfl: 3    famotidine (Pepcid) 20 mg tablet, Take 1 tablet (20 mg) by mouth 2 times a day for 15 days., Disp: 30 tablet, Rfl: 0    levonorgestrel (Mirena) 20 mcg/24hr IUD, 52 mg by intrauterine route 1 time., Disp: , Rfl:     meloxicam (Mobic) 15 mg tablet, Take 1 tablet (15 mg) by mouth early in the morning.., Disp: , Rfl:     triamcinolone (Kenalog) 0.1 % cream, Apply topically 2  times a day as needed for rash., Disp: 80 g, Rfl: 1

## 2025-05-29 LAB — PROT SERPL-MCNC: 6.9 G/DL (ref 6.4–8.2)

## 2025-06-01 LAB
ALBUMIN: 4.2 G/DL (ref 3.4–5)
ALPHA 1 GLOBULIN: 0.2 G/DL (ref 0.2–0.6)
ALPHA 2 GLOBULIN: 0.7 G/DL (ref 0.4–1.1)
B2 GLYCOPROT1 IGA SER-ACNC: 2.2 U/ML
B2 GLYCOPROT1 IGG SER-ACNC: <2 U/ML
B2 GLYCOPROT1 IGM SER-ACNC: 4.3 U/ML
BETA GLOBULIN: 0.8 G/DL (ref 0.5–1.2)
C3 SERPL-MCNC: 131 MG/DL (ref 83–193)
C4 SERPL-MCNC: 24 MG/DL (ref 15–57)
CARDIOLIPIN IGA SER IA-ACNC: <2 APL-U/ML
CARDIOLIPIN IGG SER IA-ACNC: <2 GPL-U/ML
CARDIOLIPIN IGM SER IA-ACNC: 5.9 MPL-U/ML
ERYTHROCYTE [SEDIMENTATION RATE] IN BLOOD BY WESTERGREN METHOD: 2 MM/H
GAMMA GLOBULIN: 0.9 G/DL (ref 0.5–1.4)
LA 2 SCREEN W REFLEX-IMP: NORMAL
PATH REVIEW-SERUM PROTEIN ELECTROPHORESIS: NORMAL
PROTEIN ELECTROPHORESIS COMMENT: NORMAL
SCREEN APTT: NORMAL S
SCREEN DRVVT: NORMAL S

## 2025-06-02 LAB
B2 GLYCOPROT1 IGA SER-ACNC: 2.2 U/ML
B2 GLYCOPROT1 IGG SER-ACNC: <2 U/ML
B2 GLYCOPROT1 IGM SER-ACNC: 4.3 U/ML
C3 SERPL-MCNC: 131 MG/DL (ref 83–193)
C4 SERPL-MCNC: 24 MG/DL (ref 15–57)
CARDIOLIPIN IGA SER IA-ACNC: <2 APL-U/ML
CARDIOLIPIN IGG SER IA-ACNC: <2 GPL-U/ML
CARDIOLIPIN IGM SER IA-ACNC: 5.9 MPL-U/ML
ERYTHROCYTE [SEDIMENTATION RATE] IN BLOOD BY WESTERGREN METHOD: 2 MM/H
LA 2 SCREEN W REFLEX-IMP: NORMAL
SCREEN APTT: 35 SEC
SCREEN DRVVT: 29 SEC

## 2025-06-19 ENCOUNTER — APPOINTMENT (OUTPATIENT)
Dept: RADIOLOGY | Facility: HOSPITAL | Age: 45
End: 2025-06-19
Payer: COMMERCIAL

## 2025-06-19 VITALS — HEIGHT: 62 IN | BODY MASS INDEX: 29.26 KG/M2 | WEIGHT: 159 LBS

## 2025-06-19 DIAGNOSIS — Z12.31 ENCOUNTER FOR SCREENING MAMMOGRAM FOR MALIGNANT NEOPLASM OF BREAST: ICD-10-CM

## 2025-06-19 PROCEDURE — 77067 SCR MAMMO BI INCL CAD: CPT | Performed by: RADIOLOGY

## 2025-06-19 PROCEDURE — 77063 BREAST TOMOSYNTHESIS BI: CPT | Performed by: RADIOLOGY

## 2025-06-19 PROCEDURE — 77067 SCR MAMMO BI INCL CAD: CPT

## 2025-06-24 ENCOUNTER — PATIENT MESSAGE (OUTPATIENT)
Dept: VASCULAR SURGERY | Facility: CLINIC | Age: 45
End: 2025-06-24
Payer: COMMERCIAL

## 2025-06-24 DIAGNOSIS — M35.00 SJOGREN'S SYNDROME, WITH UNSPECIFIED ORGAN INVOLVEMENT (MULTI): Primary | ICD-10-CM

## 2025-07-17 ENCOUNTER — CLINICAL SUPPORT (OUTPATIENT)
Dept: AUDIOLOGY | Facility: CLINIC | Age: 45
End: 2025-07-17
Payer: COMMERCIAL

## 2025-07-17 ENCOUNTER — OFFICE VISIT (OUTPATIENT)
Dept: OTOLARYNGOLOGY | Facility: CLINIC | Age: 45
End: 2025-07-17
Payer: COMMERCIAL

## 2025-07-17 VITALS
TEMPERATURE: 98.6 F | HEIGHT: 63 IN | WEIGHT: 161 LBS | SYSTOLIC BLOOD PRESSURE: 123 MMHG | HEART RATE: 63 BPM | DIASTOLIC BLOOD PRESSURE: 80 MMHG | BODY MASS INDEX: 28.53 KG/M2

## 2025-07-17 DIAGNOSIS — H91.92 HIGH FREQUENCY HEARING LOSS, LEFT: ICD-10-CM

## 2025-07-17 DIAGNOSIS — H93.8X2 SENSATION OF PLUGGED EAR ON LEFT SIDE: ICD-10-CM

## 2025-07-17 DIAGNOSIS — H93.8X2 EAR FULLNESS, LEFT: Primary | ICD-10-CM

## 2025-07-17 DIAGNOSIS — H93.8X2 SENSATION OF FULLNESS IN LEFT EAR: Primary | ICD-10-CM

## 2025-07-17 PROCEDURE — 92550 TYMPANOMETRY & REFLEX THRESH: CPT | Mod: 52 | Performed by: AUDIOLOGIST

## 2025-07-17 PROCEDURE — 92557 COMPREHENSIVE HEARING TEST: CPT | Performed by: AUDIOLOGIST

## 2025-07-17 PROCEDURE — 3008F BODY MASS INDEX DOCD: CPT | Performed by: NURSE PRACTITIONER

## 2025-07-17 PROCEDURE — 99203 OFFICE O/P NEW LOW 30 MIN: CPT | Performed by: NURSE PRACTITIONER

## 2025-07-17 PROCEDURE — 1036F TOBACCO NON-USER: CPT | Performed by: NURSE PRACTITIONER

## 2025-07-17 PROCEDURE — 99213 OFFICE O/P EST LOW 20 MIN: CPT | Performed by: NURSE PRACTITIONER

## 2025-07-17 SDOH — ECONOMIC STABILITY: FOOD INSECURITY: WITHIN THE PAST 12 MONTHS, THE FOOD YOU BOUGHT JUST DIDN'T LAST AND YOU DIDN'T HAVE MONEY TO GET MORE.: NEVER TRUE

## 2025-07-17 SDOH — ECONOMIC STABILITY: FOOD INSECURITY: WITHIN THE PAST 12 MONTHS, YOU WORRIED THAT YOUR FOOD WOULD RUN OUT BEFORE YOU GOT MONEY TO BUY MORE.: NEVER TRUE

## 2025-07-17 ASSESSMENT — PATIENT HEALTH QUESTIONNAIRE - PHQ9
SUM OF ALL RESPONSES TO PHQ9 QUESTIONS 1 AND 2: 0
2. FEELING DOWN, DEPRESSED OR HOPELESS: NOT AT ALL
1. LITTLE INTEREST OR PLEASURE IN DOING THINGS: NOT AT ALL

## 2025-07-17 ASSESSMENT — COLUMBIA-SUICIDE SEVERITY RATING SCALE - C-SSRS
2. HAVE YOU ACTUALLY HAD ANY THOUGHTS OF KILLING YOURSELF?: NO
6. HAVE YOU EVER DONE ANYTHING, STARTED TO DO ANYTHING, OR PREPARED TO DO ANYTHING TO END YOUR LIFE?: NO
1. IN THE PAST MONTH, HAVE YOU WISHED YOU WERE DEAD OR WISHED YOU COULD GO TO SLEEP AND NOT WAKE UP?: NO

## 2025-07-17 ASSESSMENT — LIFESTYLE VARIABLES
HOW OFTEN DO YOU HAVE A DRINK CONTAINING ALCOHOL: NEVER
HOW OFTEN DO YOU HAVE SIX OR MORE DRINKS ON ONE OCCASION: NEVER
AUDIT-C TOTAL SCORE: 0
HOW MANY STANDARD DRINKS CONTAINING ALCOHOL DO YOU HAVE ON A TYPICAL DAY: PATIENT DOES NOT DRINK
SKIP TO QUESTIONS 9-10: 1

## 2025-07-17 ASSESSMENT — PAIN SCALES - GENERAL: PAINLEVEL_OUTOF10: 0-NO PAIN

## 2025-07-17 ASSESSMENT — ENCOUNTER SYMPTOMS
LOSS OF SENSATION IN FEET: 0
OCCASIONAL FEELINGS OF UNSTEADINESS: 0
DEPRESSION: 0

## 2025-07-17 NOTE — PROGRESS NOTES
"AUDIOLOGY ADULT AUDIOMETRIC EVALUATION      Name:  Turner PARK  :  1980  Age:  44 y.o.  Date of Evaluation:  2025    HISTORY  Reason for visit:  ear problem  Ms. PARK is seen 2025 at the request of STEPHY Pulido FNP-C for an evaluation of hearing.      Chief complaint:    - left ear fullness, Left hearing loss, for years  - history of temporomandibular joint (TMJ) dysfunction, for months  - sometimes hears herself breathing, blinking  - own voice sometimes sound distorted      Hearing loss:  left, for years  Tinnitus:   ringing,  bilateral; not usually bothersome  Otitis Media: none in adulthood  Otologic surgical history:  denies  Dizziness/imbalance:  denies  Otalgia:  with illness and severe fullness; none today  Ear pressure/fullness:  left  History of excessive noise exposure:   yes, with hearing protection  Other: none         EVALUATION  Please find audiogram in \"Media\" tab (Document Type:  Audiology Report) or included at the bottom of this note.    RESULTS   Otoscopic Evaluation: ***     Immittance Measures (226 Hz probe tone):   ***    ***    Test technique:  standard behavioral technique via ***.  Reliability is ***.    Pure Tone Audiometry:  ***    Speech Audiometry:        Right Ear:  Speech Reception Threshold (SRT) was obtained at *** dBHL                 Speech discrimination score was ***% in quiet when words were presented at *** dBHL      Left Ear:  Speech Reception Threshold (SRT) was obtained at *** dBHL                 Speech discrimination score was ***% in quiet when words were presented at *** dBHL    IMPRESSIONS:  ***    RECOMMENDATIONS  ***    PATIENT EDUCATION  Discussed results and recommendations with ***.  Questions were addressed and the patient was encouraged to contact our department should concerns arise.       RO Jorge, [facility]-A  Licensed Audiologist    ***  "  Left Ear:  Speech Reception Threshold (SRT) was obtained at 10 dBHL                 Speech discrimination score was 100% in quiet when words were presented at 60 dBHL (10 item NU-6 ordered-by-difficulty list).      IMPRESSIONS:  Patient is expected to have communication difficulty in adverse listening environments.    Patient is expected to benefit from effective communication strategies.      RECOMMENDATIONS  Continue with medical follow-up with STEPHY Pulido FNP-C.  Reassess hearing in 1 year (or sooner if medically indicated or if there is a concern for a change in hearing).    Continue with medical follow-up as indicated.      PATIENT EDUCATION  Discussed results and recommendations with patient.  Questions were addressed and the patient was encouraged to contact our department should concerns arise.       RO Jorge, CCC-A  Licensed Audiologist

## 2025-07-17 NOTE — PROGRESS NOTES
Subjective   Patient ID: Turner PARK is a 44 y.o. female who presents for Ear Fullness (TMJ).    HPI  Patient here for her left ear. She has fullness that affects her hearing intermittently for the last 15 years. She feels it started after she had foot surgery. She feels she can't hear out of the left ear, it sounds muffled. No ear pain, sometimes notes drainage. Occasional tinnitus. No recent vertigo.   She hasn't had a hearing test before.  He does have TMJ dysfunction, has seen OMFS before. Has been wearing a  for ~ 4 months with no improvement.   She does sometimes her herself breathing and blinking, more on the left side.    Denies previous ear surgery. Admits to loud noise exposure, wears hearing protection. Her parents have hearing loss.     I reviewed patient's past medical and surgical history.  Problem List[1]  Surgical History[2]    Review of Systems    All other systems have been reviewed and are negative for complaints except for those mentioned in history of present illness, past medical history and problem list.    Objective   Physical Exam    Constitutional: No fever, chills, weight loss or weight gain  General appearance: Appears well, well-nourished, well groomed. No acute distress.    Communication: Normal communication    Psychiatric: Oriented to person, place and time. Normal mood and affect.    Neurologic: Cranial nerves II-XII grossly intact and symmetric bilaterally.    Head and Face:  Head: Atraumatic with no masses, lesions or scarring.  Face: Normal symmetry. No scars or deformities.  TMJ: Normal, no trismus.    Eyes: Conjunctiva not edematous or erythematous.     Right Ear: External inspection of ear with no deformity, scars, or masses. EAC is clear.  TM is intact with no sign of infection, effusion, or retraction.  No perforation seen.     Left Ear: External inspection of ear with no deformity, scars, or masses. EAC is clear.  TM is intact with no sign of infection,  effusion, or retraction.  No perforation seen.     Zamora lateralizes to the left.  AC > BC bilaterally.    Nose: External inspection of nose: No nasal lesions, lacerations or scars. Anterior rhinoscopy with limited visualization past the inferior turbinates. No tenderness on frontal or maxillary sinus palpation.    Oral Cavity/Mouth: Oral cavity and oropharynx mucosa moist and pink. No lesions or masses. Tonsils appear normal. Uvula is midline. Tongue with no masses or lesions. Tongue with good mobility. The oropharynx is clear.    Neck: Normal appearing, symmetric, trachea midline.     Cardiovascular: Examination of peripheral vascular system shows no clubbing or cyanosis.    Respiratory: No respiratory distress increased work of breathing. Inspection of the chest with symmetric chest expansion and normal respiratory effort.    Skin: No head and neck rashes.    Lymph nodes: No adenopathy.    Assessment/Plan   Diagnoses and all orders for this visit:  Sensation of fullness in left ear  Sensation of plugged ear on left side    Reassurance given that exam looks normal.  I recommend obtaining audiogram. I will follow up with results.  Also briefly discussed patulous ETD.     All questions answered to patient satisfaction.       STEPHY Viramontes-CNP 07/17/25 7:56 AM          [1] There is no problem list on file for this patient.   [2]   Past Surgical History:  Procedure Laterality Date    FOOT SURGERY  11/19/2012    Foot Surgery    MOUTH SURGERY  11/19/2012    Oral Surgery Tooth Extraction

## 2025-08-05 DIAGNOSIS — H93.8X2 SENSATION OF PLUGGED EAR ON LEFT SIDE: ICD-10-CM

## 2025-08-05 DIAGNOSIS — H93.8X2 SENSATION OF FULLNESS IN LEFT EAR: Primary | ICD-10-CM

## 2025-08-12 ENCOUNTER — APPOINTMENT (OUTPATIENT)
Dept: DERMATOLOGY | Facility: CLINIC | Age: 45
End: 2025-08-12
Payer: COMMERCIAL

## 2025-08-12 DIAGNOSIS — L73.2 HIDRADENITIS SUPPURATIVA: Primary | ICD-10-CM

## 2025-08-12 PROCEDURE — 1036F TOBACCO NON-USER: CPT | Performed by: NURSE PRACTITIONER

## 2025-08-12 PROCEDURE — 99213 OFFICE O/P EST LOW 20 MIN: CPT | Performed by: NURSE PRACTITIONER

## 2025-08-12 RX ORDER — BENZOYL PEROXIDE 100 MG/ML
1 LIQUID TOPICAL DAILY
Qty: 237 G | Refills: 11 | Status: SHIPPED | OUTPATIENT
Start: 2025-08-12 | End: 2026-08-12

## 2025-08-12 RX ORDER — CLINDAMYCIN PHOSPHATE 10 UG/ML
LOTION TOPICAL 2 TIMES DAILY
Qty: 60 ML | Refills: 1 | Status: SHIPPED | OUTPATIENT
Start: 2025-08-12

## 2025-08-12 ASSESSMENT — DERMATOLOGY QUALITY OF LIFE (QOL) ASSESSMENT
RATE HOW BOTHERED YOU ARE BY SYMPTOMS OF YOUR SKIN PROBLEM (EG, ITCHING, STINGING BURNING, HURTING OR SKIN IRRITATION): 2
WHAT SINGLE SKIN CONDITION LISTED BELOW IS THE PATIENT ANSWERING THE QUALITY-OF-LIFE ASSESSMENT QUESTIONS ABOUT: NONE OF THE ABOVE
WHAT SINGLE SKIN CONDITION LISTED BELOW IS THE PATIENT ANSWERING THE QUALITY-OF-LIFE ASSESSMENT QUESTIONS ABOUT: NONE OF THE ABOVE
RATE HOW BOTHERED YOU ARE BY EFFECTS OF YOUR SKIN PROBLEMS ON YOUR ACTIVITIES (EG, GOING OUT, ACCOMPLISHING WHAT YOU WANT, WORK ACTIVITIES OR YOUR RELATIONSHIPS WITH OTHERS): 0 - NEVER BOTHERED
RATE HOW EMOTIONALLY BOTHERED YOU ARE BY YOUR SKIN PROBLEM (FOR EXAMPLE, WORRY, EMBARRASSMENT, FRUSTRATION): 0 - NEVER BOTHERED
RATE HOW BOTHERED YOU ARE BY EFFECTS OF YOUR SKIN PROBLEMS ON YOUR ACTIVITIES (EG, GOING OUT, ACCOMPLISHING WHAT YOU WANT, WORK ACTIVITIES OR YOUR RELATIONSHIPS WITH OTHERS): 0 - NEVER BOTHERED
RATE HOW BOTHERED YOU ARE BY SYMPTOMS OF YOUR SKIN PROBLEM (EG, ITCHING, STINGING BURNING, HURTING OR SKIN IRRITATION): 2
RATE HOW EMOTIONALLY BOTHERED YOU ARE BY YOUR SKIN PROBLEM (FOR EXAMPLE, WORRY, EMBARRASSMENT, FRUSTRATION): 0 - NEVER BOTHERED

## 2025-08-12 ASSESSMENT — PATIENT GLOBAL ASSESSMENT (PGA): WHAT IS THE PGA: PATIENT GLOBAL ASSESSMENT:  1 - CLEAR

## 2025-08-18 ENCOUNTER — APPOINTMENT (OUTPATIENT)
Dept: RADIOLOGY | Facility: HOSPITAL | Age: 45
End: 2025-08-18
Payer: COMMERCIAL

## 2025-08-27 ENCOUNTER — APPOINTMENT (OUTPATIENT)
Facility: CLINIC | Age: 45
End: 2025-08-27
Payer: COMMERCIAL

## 2025-08-27 VITALS
BODY MASS INDEX: 29.2 KG/M2 | WEIGHT: 164.8 LBS | TEMPERATURE: 97.9 F | SYSTOLIC BLOOD PRESSURE: 143 MMHG | HEIGHT: 63 IN | DIASTOLIC BLOOD PRESSURE: 76 MMHG

## 2025-08-27 DIAGNOSIS — M65.939: ICD-10-CM

## 2025-08-27 DIAGNOSIS — I73.00 RAYNAUD'S PHENOMENON WITHOUT GANGRENE: ICD-10-CM

## 2025-08-27 DIAGNOSIS — M35.01 SJOGREN'S SYNDROME WITH KERATOCONJUNCTIVITIS SICCA: Primary | ICD-10-CM

## 2025-08-27 PROCEDURE — 1036F TOBACCO NON-USER: CPT | Performed by: INTERNAL MEDICINE

## 2025-08-27 PROCEDURE — 99215 OFFICE O/P EST HI 40 MIN: CPT | Performed by: INTERNAL MEDICINE

## 2025-08-27 PROCEDURE — 3008F BODY MASS INDEX DOCD: CPT | Performed by: INTERNAL MEDICINE

## 2025-08-27 RX ORDER — AMLODIPINE BESYLATE 5 MG/1
5 TABLET ORAL DAILY
Qty: 90 TABLET | Refills: 1 | Status: SHIPPED | OUTPATIENT
Start: 2025-08-27 | End: 2026-02-23

## 2025-08-28 ENCOUNTER — APPOINTMENT (OUTPATIENT)
Dept: RADIOLOGY | Facility: HOSPITAL | Age: 45
End: 2025-08-28
Payer: COMMERCIAL

## 2025-09-09 ENCOUNTER — APPOINTMENT (OUTPATIENT)
Dept: RADIOLOGY | Facility: HOSPITAL | Age: 45
End: 2025-09-09
Payer: COMMERCIAL

## 2025-09-25 ENCOUNTER — APPOINTMENT (OUTPATIENT)
Dept: DERMATOLOGY | Facility: CLINIC | Age: 45
End: 2025-09-25
Payer: COMMERCIAL

## 2025-11-05 ENCOUNTER — APPOINTMENT (OUTPATIENT)
Dept: DERMATOLOGY | Facility: CLINIC | Age: 45
End: 2025-11-05
Payer: COMMERCIAL